# Patient Record
Sex: MALE | Race: BLACK OR AFRICAN AMERICAN | NOT HISPANIC OR LATINO | URBAN - METROPOLITAN AREA
[De-identification: names, ages, dates, MRNs, and addresses within clinical notes are randomized per-mention and may not be internally consistent; named-entity substitution may affect disease eponyms.]

---

## 2017-01-11 ENCOUNTER — EMERGENCY (EMERGENCY)
Facility: HOSPITAL | Age: 32
LOS: 1 days | Discharge: HOME | End: 2017-01-11
Attending: EMERGENCY MEDICINE | Admitting: EMERGENCY MEDICINE
Payer: SELF-PAY

## 2017-01-11 VITALS
RESPIRATION RATE: 18 BRPM | DIASTOLIC BLOOD PRESSURE: 78 MMHG | SYSTOLIC BLOOD PRESSURE: 112 MMHG | HEART RATE: 88 BPM | TEMPERATURE: 98 F | OXYGEN SATURATION: 98 %

## 2017-01-11 VITALS
OXYGEN SATURATION: 98 % | HEART RATE: 92 BPM | SYSTOLIC BLOOD PRESSURE: 133 MMHG | DIASTOLIC BLOOD PRESSURE: 83 MMHG | TEMPERATURE: 98 F | RESPIRATION RATE: 18 BRPM | WEIGHT: 190.04 LBS

## 2017-01-11 DIAGNOSIS — M54.6 PAIN IN THORACIC SPINE: ICD-10-CM

## 2017-01-11 DIAGNOSIS — Z79.899 OTHER LONG TERM (CURRENT) DRUG THERAPY: ICD-10-CM
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
## 2017-01-11 DIAGNOSIS — S63.501A UNSPECIFIED SPRAIN OF RIGHT WRIST, INITIAL ENCOUNTER: ICD-10-CM
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
## 2017-01-11 PROCEDURE — 71101 X-RAY EXAM UNILAT RIBS/CHEST: CPT | Mod: 26

## 2017-01-11 PROCEDURE — 99284 EMERGENCY DEPT VISIT MOD MDM: CPT | Mod: 25

## 2017-01-11 PROCEDURE — 71101 X-RAY EXAM UNILAT RIBS/CHEST: CPT

## 2017-01-11 PROCEDURE — 73110 X-RAY EXAM OF WRIST: CPT

## 2017-01-11 PROCEDURE — 99284 EMERGENCY DEPT VISIT MOD MDM: CPT

## 2017-01-11 PROCEDURE — 73110 X-RAY EXAM OF WRIST: CPT | Mod: 26,RT

## 2017-01-11 NOTE — ED ADULT NURSE NOTE - OBJECTIVE STATEMENT
Patient presents to emergency department with complaint of right wrist pain and back pain after falling last week while playing basketball. SKin intact, no bruising, no swelling.

## 2017-01-11 NOTE — ED PROVIDER NOTE - OBJECTIVE STATEMENT
pt to ed co pain to upper left back and right wrist NVI FROM pt has a fall while playing basketball last week and was concerned even though pain is improving no open skin no dizzy no NVD no LOC no head injury

## 2017-07-14 ENCOUNTER — EMERGENCY (EMERGENCY)
Facility: HOSPITAL | Age: 32
LOS: 1 days | Discharge: HOME | End: 2017-07-14
Attending: EMERGENCY MEDICINE | Admitting: EMERGENCY MEDICINE
Payer: SELF-PAY

## 2017-07-14 VITALS
RESPIRATION RATE: 16 BRPM | WEIGHT: 190.04 LBS | OXYGEN SATURATION: 97 % | HEIGHT: 70 IN | DIASTOLIC BLOOD PRESSURE: 69 MMHG | HEART RATE: 80 BPM | SYSTOLIC BLOOD PRESSURE: 116 MMHG | TEMPERATURE: 99 F

## 2017-07-14 DIAGNOSIS — R13.10 DYSPHAGIA, UNSPECIFIED: ICD-10-CM

## 2017-07-14 DIAGNOSIS — J02.9 ACUTE PHARYNGITIS, UNSPECIFIED: ICD-10-CM

## 2017-07-14 DIAGNOSIS — Z79.2 LONG TERM (CURRENT) USE OF ANTIBIOTICS: ICD-10-CM

## 2017-07-14 PROCEDURE — 99283 EMERGENCY DEPT VISIT LOW MDM: CPT

## 2017-07-14 NOTE — ED PROVIDER NOTE - MEDICAL DECISION MAKING DETAILS
Persistent odynophagia x 1 week, likely due to viral pharyngitis resolving with possible mild persistent inflammation to larynx.  No ev of abscess, airway obstruction or bacterial infection.  HD stable.  Plan outpt steroids and nsaids and fu clinic.

## 2017-07-14 NOTE — ED PROVIDER NOTE - ENMT, MLM
Airway patent, Nasal mucosa clear. Mouth with normal mucosa. Throat has no vesicles, no oropharyngeal exudates and uvula is midline.  Nl thyroid.  No adenopathy.  Mild point ttp superior and L lateral to top of thyroid cartilage.  Nl voice.  No drooling or stridor.  Nl posterior pharynx.

## 2017-07-14 NOTE — ED ADULT NURSE NOTE - OBJECTIVE STATEMENT
31y M, A&ox3, came into ER c/o throat pain. No patches, no difficulty swallowing. Mild inflammation. No drooling. No fever, no chills. No cp, no sob. NAD.

## 2017-07-14 NOTE — ED PROVIDER NOTE - OBJECTIVE STATEMENT
32 yo M with no sig pmh presenting with sore throat x 1 week, progressively improved but lingering pain when swallowing above 'gallo apple'.  Denies fever, chills, cough, sob, chest pain or rash.

## 2017-07-14 NOTE — ED ADULT NURSE NOTE - CHPI ED SYMPTOMS NEG
no vomiting/no loss of consciousness/no numbness/no blurred vision/no syncope/no nausea/no chills/no change in level of consciousness/no fever

## 2017-10-19 ENCOUNTER — EMERGENCY (EMERGENCY)
Facility: HOSPITAL | Age: 32
LOS: 1 days | Discharge: HOME | End: 2017-10-19
Admitting: EMERGENCY MEDICINE
Payer: SELF-PAY

## 2017-10-19 VITALS
OXYGEN SATURATION: 96 % | WEIGHT: 169.98 LBS | SYSTOLIC BLOOD PRESSURE: 110 MMHG | RESPIRATION RATE: 20 BRPM | TEMPERATURE: 99 F | HEART RATE: 96 BPM | DIASTOLIC BLOOD PRESSURE: 58 MMHG | HEIGHT: 71 IN

## 2017-10-19 DIAGNOSIS — S69.91XA UNSPECIFIED INJURY OF RIGHT WRIST, HAND AND FINGER(S), INITIAL ENCOUNTER: ICD-10-CM
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
## 2017-10-19 DIAGNOSIS — Z79.1 LONG TERM (CURRENT) USE OF NON-STEROIDAL ANTI-INFLAMMATORIES (NSAID): ICD-10-CM

## 2017-10-19 DIAGNOSIS — Z79.2 LONG TERM (CURRENT) USE OF ANTIBIOTICS: ICD-10-CM
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
## 2017-10-19 DIAGNOSIS — Z79.52 LONG TERM (CURRENT) USE OF SYSTEMIC STEROIDS: ICD-10-CM
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
## 2017-10-19 DIAGNOSIS — Z23 ENCOUNTER FOR IMMUNIZATION: ICD-10-CM

## 2017-10-19 PROCEDURE — 99283 EMERGENCY DEPT VISIT LOW MDM: CPT

## 2017-10-19 PROCEDURE — 90715 TDAP VACCINE 7 YRS/> IM: CPT

## 2017-10-19 PROCEDURE — 99283 EMERGENCY DEPT VISIT LOW MDM: CPT | Mod: 25

## 2017-10-19 PROCEDURE — 90471 IMMUNIZATION ADMIN: CPT

## 2017-10-19 RX ORDER — TETANUS TOXOID, REDUCED DIPHTHERIA TOXOID AND ACELLULAR PERTUSSIS VACCINE, ADSORBED 5; 2.5; 8; 8; 2.5 [IU]/.5ML; [IU]/.5ML; UG/.5ML; UG/.5ML; UG/.5ML
0.5 SUSPENSION INTRAMUSCULAR ONCE
Qty: 0 | Refills: 0 | Status: COMPLETED | OUTPATIENT
Start: 2017-10-19 | End: 2017-10-19

## 2017-10-19 RX ADMIN — TETANUS TOXOID, REDUCED DIPHTHERIA TOXOID AND ACELLULAR PERTUSSIS VACCINE, ADSORBED 0.5 MILLILITER(S): 5; 2.5; 8; 8; 2.5 SUSPENSION INTRAMUSCULAR at 12:24

## 2017-10-19 NOTE — ED ADULT TRIAGE NOTE - CHIEF COMPLAINT QUOTE
" I need tetanus vaccine for my finger had tianna nail while working 24 hr ago  and xray to my left ring finger that I injured few months ago .

## 2017-10-19 NOTE — ED PROVIDER NOTE - OBJECTIVE STATEMENT
30 yo male in the ER for a tetanus booster. Pt reports that he got stuck with a tianna nail at work yesterday. Pt has no pain to his injured finger.

## 2017-10-19 NOTE — ED ADULT NURSE NOTE - OBJECTIVE STATEMENT
31y M cames into ER for tetanus shot update. Pt states tianna nail went through gloves earlier this week. Cannot recall last tetanus shot. No abrasion nor puncture wounds noted. No bleeding, no deformity. No pain. NAD.

## 2017-10-19 NOTE — ED PROVIDER NOTE - MEDICAL DECISION MAKING DETAILS
pt with a puncture wound to his right 3rd digit, sustained at work an a tianna nail. No clinical findings to suspect infection. tetanus booster given, ready for discharge

## 2018-09-05 ENCOUNTER — EMERGENCY (EMERGENCY)
Facility: HOSPITAL | Age: 33
LOS: 1 days | Discharge: HOME | End: 2018-09-05
Attending: EMERGENCY MEDICINE | Admitting: EMERGENCY MEDICINE
Payer: SELF-PAY

## 2018-09-05 VITALS
DIASTOLIC BLOOD PRESSURE: 70 MMHG | HEART RATE: 64 BPM | TEMPERATURE: 98 F | OXYGEN SATURATION: 100 % | RESPIRATION RATE: 18 BRPM | SYSTOLIC BLOOD PRESSURE: 118 MMHG

## 2018-09-05 DIAGNOSIS — M25.512 PAIN IN LEFT SHOULDER: ICD-10-CM

## 2018-09-05 DIAGNOSIS — Z79.52 LONG TERM (CURRENT) USE OF SYSTEMIC STEROIDS: ICD-10-CM

## 2018-09-05 DIAGNOSIS — Z79.2 LONG TERM (CURRENT) USE OF ANTIBIOTICS: ICD-10-CM

## 2018-09-05 DIAGNOSIS — M25.511 PAIN IN RIGHT SHOULDER: ICD-10-CM

## 2018-09-05 DIAGNOSIS — Z79.1 LONG TERM (CURRENT) USE OF NON-STEROIDAL ANTI-INFLAMMATORIES (NSAID): ICD-10-CM

## 2018-09-05 PROCEDURE — 99053 MED SERV 10PM-8AM 24 HR FAC: CPT

## 2018-09-05 PROCEDURE — 99283 EMERGENCY DEPT VISIT LOW MDM: CPT | Mod: 25

## 2018-09-05 PROCEDURE — 73030 X-RAY EXAM OF SHOULDER: CPT | Mod: 26,50

## 2018-09-05 RX ORDER — LIDOCAINE 4 G/100G
1 CREAM TOPICAL ONCE
Qty: 0 | Refills: 0 | Status: DISCONTINUED | OUTPATIENT
Start: 2018-09-05 | End: 2018-09-05

## 2018-09-05 RX ORDER — LIDOCAINE 4 G/100G
2 CREAM TOPICAL ONCE
Qty: 0 | Refills: 0 | Status: COMPLETED | OUTPATIENT
Start: 2018-09-05 | End: 2018-09-05

## 2018-09-05 NOTE — ED PROVIDER NOTE - PHYSICAL EXAMINATION
CON: ao x 3, HENMT: clear oropharynx, soft neck, HEAD: atraumatic, SKIN: no rash, MSK: no deformities, full ROM b/l shoulder, soft compartment, NEURO: no gross motor or sensory deficit

## 2018-09-05 NOTE — ED PROVIDER NOTE - DIAGNOSTIC INTERPRETATION
ER Physician: Douglas Valladares  BILATERAL SHOULDER XRAY INTERPRETATION:  no acute fracture; no soft tissue swelling noted; normal bony alignment.

## 2018-09-05 NOTE — ED PROVIDER NOTE - OBJECTIVE STATEMENT
32 yom pw bl shoulder pain after lifting heavy object, ~7/8 days ago, initially difficulty moving/ROM, but steadily improving, no trauma.  no other complaints.

## 2018-09-05 NOTE — ED ADULT NURSE NOTE - OBJECTIVE STATEMENT
Patient to the ED with complaint of b/l shoulder pain for a week, stated that he carried heavy grocery and feels that he injured his shoulders and wants to have them x-rayed.

## 2018-09-06 PROCEDURE — 99284 EMERGENCY DEPT VISIT MOD MDM: CPT

## 2018-09-06 PROCEDURE — 73030 X-RAY EXAM OF SHOULDER: CPT

## 2018-09-06 PROCEDURE — 73030 X-RAY EXAM OF SHOULDER: CPT | Mod: 26,50

## 2018-09-06 RX ADMIN — LIDOCAINE 2 PATCH: 4 CREAM TOPICAL at 00:34

## 2020-03-09 NOTE — ED ADULT NURSE NOTE - CCCP TRG CHIEF CMPLNT
Pt is requesting that the following medications  pitavastatin calcium (LIVALO) 2 MG tablet tablet        Sig: Take 1 tablet by mouth Every Night.          valsartan-hydrochlorothiazide (DIOVAN-HCT) 80-12.5 MG per tablet        Sig: Take 1 tablet by mouth Daily.          divalproex (DEPAKOTE) 500 MG 24 hr tablet        Sig: Take 500 mg by mouth Daily.          [facility] with [facility] 90 day supply.     
shoulder pain/injury
(0) independent

## 2021-02-16 NOTE — ED ADULT NURSE NOTE - NSIMPLEMENTINTERV_GEN_ALL_ED
Implemented All Universal Safety Interventions:  [place] to call system. Call bell, personal items and telephone within reach. Instruct patient to call for assistance. Room bathroom lighting operational. Non-slip footwear when patient is off stretcher. Physically safe environment: no spills, clutter or unnecessary equipment. Stretcher in lowest position, wheels locked, appropriate side rails in place.
no

## 2022-03-24 NOTE — ED ADULT TRIAGE NOTE - ACCOMPANIED BY
Self [Well Groomed] : well groomed [] : no respiratory distress [General Appearance - In No Acute Distress] : no acute distress

## 2024-09-09 NOTE — ED PROVIDER NOTE - DISPOSITION TYPE
"Debridement    Date/Time: 9/9/2024 7:30 AM    Performed by: Rosalba Eaton PA-C  Authorized by: Rosalba Eaton PA-C    Time out: Immediately prior to procedure a "time out" was called to verify the correct patient, procedure, equipment, support staff and site/side marked as required.    Consent Done?:  Yes (Written)  Local anesthesia used?: Yes    Local anesthetic:  Topical anesthetic (Topical 4% Lidocaine Hydrochloride)    Wound Details:    Location:  Right leg    Type of Debridement:  Excisional       Length (cm):  3.8       Area (sq cm):  20.14       Width (cm):  5.3       Percent Debrided (%):  100       Depth (cm):  0.1       Total Area Debrided (sq cm):  20.14    Depth of debridement:  Subcutaneous tissue    Tissue debrided:  Subcutaneous    Devitalized tissue debrided:  Biofilm, Exudate, Fibrin and Slough    Instruments:  Curette  Bleeding:  Minimal  Hemostasis Achieved: Yes  Method Used:  Pressure  Patient tolerance:  Patient tolerated the procedure well with no immediate complications    "
DISCHARGE

## 2025-03-25 NOTE — ED ADULT TRIAGE NOTE - HEIGHT IN FEET
[facility] - Outpatient Rehabilitation and Therapy [address] [place] [id number] office: [phone] fax: [phone]          Physical Therapy: TREATMENT/PROGRESS NOTE   Patient: Antolin Pena (71 y.o. male)   Treatment Date: 2025   :  1953 MRN: [id number]   Visit #: 100   Insurance Allowable Auth Needed   medicare []Yes    [x]No    Insurance: Payor: MEDICARE / Plan: MEDICARE PART A AND B / Product Type: *No Product type* /   Insurance ID: [id number] - (Medicare)  Secondary Insurance (if applicable): [employer] [place]   Treatment Diagnosis:     ICD-10-CM    1. Weakness generalized  R53.1       2. Chronic pain of left knee  M25.562     G89.29          Medical Diagnosis:    Age-related physical debility [R54]   Referring Physician: Kinza Padilla MD  PCP: Kaleb Lujan DO                             Plan of care signed (Y/N): N      Date of Patient follow up with Physician:      Progress Report/POC: no  POC update due: (10 visits /OR AUTH LIMITS, whichever is less) 54 visits or 2025     Precautions/ Contra-indications:                                                                                          Latex allergy:  NO  Pacemaker:    NO  Contraindications for Manipulation: unhealthy/ multiple comorbidities   Date of Surgery: n/a  Other:     Preferred Language for Healthcare:   [x]English       []other:      SUBJECTIVE EXAMINATION     Patient Report/Comments:  Pt states that he got a shot in his back yesterday and is feeling a little better. \"It usually takes a couple of days\".  Feeling ok energy wise this morning, \"for now\".               Test used Initial score  2025   Pain Summary VAS 0-2 0/10   Functional questionnaire LEFS and Nelson Balance Scale  LEFS: 32  NELSON/56   Other:                OBJECTIVE EXAMINATION     Observation:   3/20/25: I use of inhaler for SOB. Multiple seated rest breaks taken per SOB and generalized 
5